# Patient Record
Sex: MALE | Race: WHITE | NOT HISPANIC OR LATINO | Employment: FULL TIME | URBAN - METROPOLITAN AREA
[De-identification: names, ages, dates, MRNs, and addresses within clinical notes are randomized per-mention and may not be internally consistent; named-entity substitution may affect disease eponyms.]

---

## 2021-03-31 ENCOUNTER — HOSPITAL ENCOUNTER (EMERGENCY)
Facility: HOSPITAL | Age: 31
Discharge: HOME | End: 2021-03-31
Attending: EMERGENCY MEDICINE
Payer: COMMERCIAL

## 2021-03-31 VITALS
OXYGEN SATURATION: 100 % | RESPIRATION RATE: 16 BRPM | HEART RATE: 58 BPM | DIASTOLIC BLOOD PRESSURE: 84 MMHG | TEMPERATURE: 98 F | SYSTOLIC BLOOD PRESSURE: 151 MMHG

## 2021-03-31 DIAGNOSIS — R00.2 PALPITATIONS: Primary | ICD-10-CM

## 2021-03-31 LAB
ALBUMIN SERPL BCP-MCNC: 4.5 G/DL (ref 3.5–5)
ALP SERPL-CCNC: 71 U/L (ref 46–116)
ALT SERPL W P-5'-P-CCNC: 21 U/L (ref 12–78)
ANION GAP SERPL CALCULATED.3IONS-SCNC: 8 MMOL/L (ref 4–13)
AST SERPL W P-5'-P-CCNC: 15 U/L (ref 5–45)
BACTERIA UR QL AUTO: ABNORMAL /HPF
BASOPHILS # BLD AUTO: 0.06 THOUSANDS/ΜL (ref 0–0.1)
BASOPHILS NFR BLD AUTO: 1 % (ref 0–1)
BILIRUB SERPL-MCNC: 0.91 MG/DL (ref 0.2–1)
BILIRUB UR QL STRIP: NEGATIVE
BUN SERPL-MCNC: 16 MG/DL (ref 5–25)
CALCIUM SERPL-MCNC: 9.3 MG/DL (ref 8.3–10.1)
CHLORIDE SERPL-SCNC: 105 MMOL/L (ref 100–108)
CLARITY UR: CLEAR
CO2 SERPL-SCNC: 25 MMOL/L (ref 21–32)
COLOR UR: YELLOW
CREAT SERPL-MCNC: 0.91 MG/DL (ref 0.6–1.3)
EOSINOPHIL # BLD AUTO: 0.13 THOUSAND/ΜL (ref 0–0.61)
EOSINOPHIL NFR BLD AUTO: 2 % (ref 0–6)
ERYTHROCYTE [DISTWIDTH] IN BLOOD BY AUTOMATED COUNT: 13 % (ref 11.6–15.1)
FLUAV RNA RESP QL NAA+PROBE: NEGATIVE
FLUBV RNA RESP QL NAA+PROBE: NEGATIVE
GFR SERPL CREATININE-BSD FRML MDRD: 111 ML/MIN/1.73SQ M
GLUCOSE SERPL-MCNC: 92 MG/DL (ref 65–140)
GLUCOSE UR STRIP-MCNC: NEGATIVE MG/DL
HCT VFR BLD AUTO: 49 % (ref 36.5–49.3)
HGB BLD-MCNC: 16.3 G/DL (ref 12–17)
HGB UR QL STRIP.AUTO: NEGATIVE
IMM GRANULOCYTES # BLD AUTO: 0.06 THOUSAND/UL (ref 0–0.2)
IMM GRANULOCYTES NFR BLD AUTO: 1 % (ref 0–2)
KETONES UR STRIP-MCNC: NEGATIVE MG/DL
LEUKOCYTE ESTERASE UR QL STRIP: ABNORMAL
LYMPHOCYTES # BLD AUTO: 2.26 THOUSANDS/ΜL (ref 0.6–4.47)
LYMPHOCYTES NFR BLD AUTO: 35 % (ref 14–44)
MCH RBC QN AUTO: 29.9 PG (ref 26.8–34.3)
MCHC RBC AUTO-ENTMCNC: 33.3 G/DL (ref 31.4–37.4)
MCV RBC AUTO: 90 FL (ref 82–98)
MONOCYTES # BLD AUTO: 0.69 THOUSAND/ΜL (ref 0.17–1.22)
MONOCYTES NFR BLD AUTO: 11 % (ref 4–12)
NEUTROPHILS # BLD AUTO: 3.23 THOUSANDS/ΜL (ref 1.85–7.62)
NEUTS SEG NFR BLD AUTO: 50 % (ref 43–75)
NITRITE UR QL STRIP: NEGATIVE
NON-SQ EPI CELLS URNS QL MICRO: ABNORMAL /HPF
NRBC BLD AUTO-RTO: 0 /100 WBCS
PH UR STRIP.AUTO: 7.5 [PH]
PLATELET # BLD AUTO: 204 THOUSANDS/UL (ref 149–390)
PMV BLD AUTO: 9.7 FL (ref 8.9–12.7)
POTASSIUM SERPL-SCNC: 3.9 MMOL/L (ref 3.5–5.3)
PROT SERPL-MCNC: 7.6 G/DL (ref 6.4–8.2)
PROT UR STRIP-MCNC: NEGATIVE MG/DL
RBC # BLD AUTO: 5.46 MILLION/UL (ref 3.88–5.62)
RBC #/AREA URNS AUTO: ABNORMAL /HPF
RSV RNA RESP QL NAA+PROBE: NEGATIVE
SARS-COV-2 RNA RESP QL NAA+PROBE: NEGATIVE
SODIUM SERPL-SCNC: 138 MMOL/L (ref 136–145)
SP GR UR STRIP.AUTO: 1.02 (ref 1–1.03)
UROBILINOGEN UR QL STRIP.AUTO: 0.2 E.U./DL
WBC # BLD AUTO: 6.43 THOUSAND/UL (ref 4.31–10.16)
WBC #/AREA URNS AUTO: ABNORMAL /HPF

## 2021-03-31 PROCEDURE — 80053 COMPREHEN METABOLIC PANEL: CPT | Performed by: PSYCHIATRY & NEUROLOGY

## 2021-03-31 PROCEDURE — 99284 EMERGENCY DEPT VISIT MOD MDM: CPT

## 2021-03-31 PROCEDURE — 36415 COLL VENOUS BLD VENIPUNCTURE: CPT | Performed by: PSYCHIATRY & NEUROLOGY

## 2021-03-31 PROCEDURE — 93005 ELECTROCARDIOGRAM TRACING: CPT

## 2021-03-31 PROCEDURE — 85025 COMPLETE CBC W/AUTO DIFF WBC: CPT | Performed by: PSYCHIATRY & NEUROLOGY

## 2021-03-31 PROCEDURE — 99284 EMERGENCY DEPT VISIT MOD MDM: CPT | Performed by: EMERGENCY MEDICINE

## 2021-03-31 PROCEDURE — 81001 URINALYSIS AUTO W/SCOPE: CPT | Performed by: PSYCHIATRY & NEUROLOGY

## 2021-03-31 PROCEDURE — 0241U HB NFCT DS VIR RESP RNA 4 TRGT: CPT | Performed by: PSYCHIATRY & NEUROLOGY

## 2021-03-31 NOTE — ED PROVIDER NOTES
History  Chief Complaint   Patient presents with    Medical Problem     States "i was sitting on my couch today and my body forgot how to breath and I got really dizzy " C/o R sided neck pain at this time  75-year-old male with no pertinent medical history presents to ED after experiencing a few seconds where he "forgot how to breath" and developed numbness and tingling in both hands  Pt reports this happened 1 hour ago while he was sitting on the couch, he said out of where he started to panic because he could not breath  He immediately got up to run due to panic and felt dizzy, developed vision changes and felt lightheaded  The symptoms lasted about 5 seconds and pt became worried  Pt reports he does not have any medical issues, was being treated for anxiety but he had side effects to the medications and has stopped them  Pt reports he takes no medications daily, pt admits to having daily nighttime chest pain with palpitations  In the past pt has gone to his family doctor to evaluate chest is and the cardiac workup was negative each time  Pt denies headache and states all of his symptoms have passed, but he still feels bit short breath  Pt also has concern of having COVID due to his work at [employer] and being around many people daily  Pt reports that work has been stressful for the last week because his supervisor and many other employees have been calling out sick  Pt reports only having 1 cup coffee today, he does not eat during the day; reports having 1 meal at night and drinks multiple cups of coffee a day  He also complains of dark urine and reports not hydrating enough with water throughout the day  Family Hx is positive for mother with HTN, ovarian Ca and DM  Pt denies alcohol use and illicit drug use, he does smoke 1 pack of cigarettes weekly  None       History reviewed  No pertinent past medical history      Past Surgical History:   Procedure Laterality Date    NASAL SEPTUM SURGERY History reviewed  No pertinent family history  I have reviewed and agree with the history as documented  E-Cigarette/Vaping     E-Cigarette/Vaping Substances     Social History     Tobacco Use    Smoking status: Current Every Day Smoker     Packs/day: 0 25     Types: Cigarettes    Smokeless tobacco: Never Used   Substance Use Topics    Alcohol use: Never     Frequency: Never    Drug use: Not on file        Review of Systems   Constitutional: Negative for activity change and fever  HENT: Positive for trouble swallowing  Negative for rhinorrhea and sore throat  Respiratory: Positive for shortness of breath  Negative for chest tightness  Cardiovascular: Positive for chest pain (daily every night) and palpitations (daily every night)  Gastrointestinal: Negative for abdominal pain, constipation, diarrhea and vomiting  Genitourinary:        Dark urine   Musculoskeletal: Negative for back pain  Neurological: Positive for dizziness, light-headedness and numbness  Negative for weakness  Psychiatric/Behavioral: The patient is nervous/anxious  All other systems reviewed and are negative  Physical Exam  ED Triage Vitals   Temperature Pulse Respirations Blood Pressure SpO2   03/31/21 1312 03/31/21 1310 03/31/21 1312 03/31/21 1312 03/31/21 1310   98 °F (36 7 °C) 64 16 151/84 100 %      Temp Source Heart Rate Source Patient Position - Orthostatic VS BP Location FiO2 (%)   03/31/21 1312 03/31/21 1310 03/31/21 1312 03/31/21 1312 --   Oral Monitor Sitting Left arm       Pain Score       --                    Orthostatic Vital Signs  Vitals:    03/31/21 1310 03/31/21 1312   BP:  151/84   Pulse: 64 58   Patient Position - Orthostatic VS:  Sitting       Physical Exam  Vitals signs and nursing note reviewed  Constitutional:       General: He is not in acute distress  Appearance: He is well-developed  He is not toxic-appearing  HENT:      Head: Normocephalic and atraumatic     Eyes: General: No scleral icterus  Right eye: No discharge  Left eye: No discharge  Conjunctiva/sclera: Conjunctivae normal    Neck:      Musculoskeletal: Neck supple  Cardiovascular:      Rate and Rhythm: Normal rate and regular rhythm  Pulses: Normal pulses  Heart sounds: No murmur  Pulmonary:      Effort: Pulmonary effort is normal  No respiratory distress  Breath sounds: Normal breath sounds  Chest:      Chest wall: No tenderness  Abdominal:      General: Abdomen is flat  There is no distension  Palpations: Abdomen is soft  Tenderness: There is no abdominal tenderness  There is no guarding or rebound  Skin:     General: Skin is warm and dry  Neurological:      General: No focal deficit present  Mental Status: He is alert and oriented to person, place, and time  Mental status is at baseline  Cranial Nerves: No cranial nerve deficit  Motor: No weakness  Coordination: Coordination normal    Psychiatric:         Mood and Affect: Mood is anxious  Behavior: Behavior normal  Behavior is cooperative  ED Medications  Medications - No data to display    Diagnostic Studies  Results Reviewed     Procedure Component Value Units Date/Time    COVID19, Influenza A/B, RSV PCR, [facility] [[id number]]  (Normal) Collected: 03/31/21 1407    Lab Status: Final result Specimen: Nares from Nose Updated: 03/31/21 1458     SARS-CoV-2 Negative     INFLUENZA A PCR Negative     INFLUENZA B PCR Negative     RSV PCR Negative    Narrative: This test has been authorized by FDA under an EUA (Emergency Use Assay) for use by authorized laboratories  Clinical caution and judgement should be used with the interpretation of these results with consideration of the clinical impression and other laboratory testing  Testing reported as "Positive" or "Negative" has been proven to be accurate according to standard laboratory validation requirements    All testing is performed with control materials showing appropriate reactivity at standard intervals      Urinalysis with microscopic [[id number]]  (Abnormal) Collected: 03/31/21 1407    Lab Status: Final result Specimen: Urine, Clean Catch Updated: 03/31/21 1433     Clarity, UA Clear     Color, UA Yellow     Specific Gravity, UA 1 020     pH, UA 7 5     Glucose, UA Negative mg/dl      Ketones, UA Negative mg/dl      Blood, UA Negative     Protein, UA Negative mg/dl      Nitrite, UA Negative     Bilirubin, UA Negative     Urobilinogen, UA 0 2 E U /dl      Leukocytes, UA Trace     WBC, UA 0-1 /hpf      RBC, UA 0-1 /hpf      Bacteria, UA Occasional /hpf      Epithelial Cells Occasional /hpf     Comprehensive metabolic panel [[id number]] Collected: 03/31/21 1407    Lab Status: Final result Specimen: Blood from Arm, Left Updated: 03/31/21 1431     Sodium 138 mmol/L      Potassium 3 9 mmol/L      Chloride 105 mmol/L      CO2 25 mmol/L      ANION GAP 8 mmol/L      BUN 16 mg/dL      Creatinine 0 91 mg/dL      Glucose 92 mg/dL      Calcium 9 3 mg/dL      AST 15 U/L      ALT 21 U/L      Alkaline Phosphatase 71 U/L      Total Protein 7 6 g/dL      Albumin 4 5 g/dL      Total Bilirubin 0 91 mg/dL      eGFR 111 ml/min/1 73sq m     Narrative:      [place] guidelines for Chronic Kidney Disease (CKD):     Stage 1 with normal or high GFR (GFR > 90 mL/min/1 73 square meters)    Stage 2 Mild CKD (GFR = 60-89 mL/min/1 73 square meters)    Stage 3A Moderate CKD (GFR = 45-59 mL/min/1 73 square meters)    Stage 3B Moderate CKD (GFR = 30-44 mL/min/1 73 square meters)    Stage 4 Severe CKD (GFR = 15-29 mL/min/1 73 square meters)    Stage 5 End Stage CKD (GFR <15 mL/min/1 73 square meters)  Note: GFR calculation is accurate only with a steady state creatinine    CBC and differential [[id number]] Collected: 03/31/21 1407    Lab Status: Final result Specimen: Blood from Arm, Left Updated: 03/31/21 1418     WBC 6 43 Thousand/uL RBC 5 46 Million/uL      Hemoglobin 16 3 g/dL      Hematocrit 49 0 %      MCV 90 fL      MCH 29 9 pg      MCHC 33 3 g/dL      RDW 13 0 %      MPV 9 7 fL      Platelets 667 Thousands/uL      nRBC 0 /100 WBCs      Neutrophils Relative 50 %      Immat GRANS % 1 %      Lymphocytes Relative 35 %      Monocytes Relative 11 %      Eosinophils Relative 2 %      Basophils Relative 1 %      Neutrophils Absolute 3 23 Thousands/µL      Immature Grans Absolute 0 06 Thousand/uL      Lymphocytes Absolute 2 26 Thousands/µL      Monocytes Absolute 0 69 Thousand/µL      Eosinophils Absolute 0 13 Thousand/µL      Basophils Absolute 0 06 Thousands/µL                  No orders to display         Procedures  Procedures      ED Course  ED Course as of Mar 31 1700   Wed Mar 31, 2021   1355 DDX: Anxiety related somatic symptoms, UTI, anemia, electrolyte abnormality secondary to dehydration & poor po intake, less likely ACS, COVID19 infection      1356 UA, CMP, CBC, COVID19, EKG ordered      1429 Normal   CBC and differential   1430 Resident interpretation- Sinus zara, rate 58bpm, normal MS interval, no evidence of ischemia, change from previous EKG in 2019 [facility] at [facility]   ECG 12 lead   1449 Unremarkable    Comprehensive metabolic panel   3808 Trace leukocytes, no urinary symptoms    Urinalysis with microscopic(!)   1652 negative   COVID19, Influenza A/B, RSV PCR, [facility]   1653 Results were reviewed with pt    Pt was given a referral to Cardiology, due to his borderline EKG showing sinus bradycardia with sinus arrhythmia and incomplete RBBB                                  SBIRT 20yo+      Most Recent Value   SBIRT (25 yo +)   In order to provide better care to our patients, we are screening all of our patients for alcohol and drug use  Would it be okay to ask you these screening questions?   No Filed at: 03/31/2021 1316                [facility]    Disposition  Final diagnoses:   Palpitations     Time reflects when diagnosis was documented in both MDM as applicable and the Disposition within this note     Time User Action Codes Description Comment    3/31/2021  3:23 PM Theodora Pierre Add [R00 2] Palpitations       ED Disposition     ED Disposition Condition Date/Time Comment    Discharge Stable Wed Mar 31, 2021  3:25 PM Reynaldo Diaz  [address] discharge to home/self care  Follow-up Information     Follow up With Specialties Details Why Contact Info Additional Information    [facility] Schedule an appointment as soon as possible for a visit  To follow up with chest pain and palpitations [phone]  [address] Cardiology [address] [facility], [address] [place] [address], [address] [place] [address]          There are no discharge medications for this patient  PDMP Review     None           ED Provider  Attending physically available and evaluated Reynaldo Joe  [address]  I managed the patient along with the ED Attending      Electronically Signed by         Kristie Pichardo DO  03/31/21 9546

## 2021-04-01 LAB
ATRIAL RATE: 58 BPM
P AXIS: 52 DEGREES
PR INTERVAL: 122 MS
QRS AXIS: 67 DEGREES
QRSD INTERVAL: 92 MS
QT INTERVAL: 448 MS
QTC INTERVAL: 439 MS
T WAVE AXIS: 56 DEGREES
VENTRICULAR RATE: 58 BPM

## 2021-04-01 PROCEDURE — 93010 ELECTROCARDIOGRAM REPORT: CPT | Performed by: INTERNAL MEDICINE

## 2024-10-07 ENCOUNTER — TELEPHONE (OUTPATIENT)
Dept: GASTROENTEROLOGY | Facility: CLINIC | Age: 34
End: 2024-10-07

## 2024-10-07 ENCOUNTER — APPOINTMENT (OUTPATIENT)
Dept: LAB | Facility: HOSPITAL | Age: 34
End: 2024-10-07
Payer: COMMERCIAL

## 2024-10-07 ENCOUNTER — OFFICE VISIT (OUTPATIENT)
Dept: GASTROENTEROLOGY | Facility: CLINIC | Age: 34
End: 2024-10-07
Payer: COMMERCIAL

## 2024-10-07 VITALS
DIASTOLIC BLOOD PRESSURE: 81 MMHG | SYSTOLIC BLOOD PRESSURE: 123 MMHG | HEIGHT: 70 IN | WEIGHT: 200 LBS | HEART RATE: 60 BPM | BODY MASS INDEX: 28.63 KG/M2

## 2024-10-07 DIAGNOSIS — K92.1 BLOOD IN STOOL: ICD-10-CM

## 2024-10-07 DIAGNOSIS — K92.1 BLOOD IN STOOL: Primary | ICD-10-CM

## 2024-10-07 LAB
ALBUMIN SERPL BCG-MCNC: 4.5 G/DL (ref 3.5–5)
ALP SERPL-CCNC: 68 U/L (ref 34–104)
ALT SERPL W P-5'-P-CCNC: 22 U/L (ref 7–52)
ANION GAP SERPL CALCULATED.3IONS-SCNC: 7 MMOL/L (ref 4–13)
AST SERPL W P-5'-P-CCNC: 20 U/L (ref 13–39)
BASOPHILS # BLD AUTO: 0.07 THOUSANDS/ΜL (ref 0–0.1)
BASOPHILS NFR BLD AUTO: 1 % (ref 0–1)
BILIRUB SERPL-MCNC: 0.8 MG/DL (ref 0.2–1)
BUN SERPL-MCNC: 18 MG/DL (ref 5–25)
CALCIUM SERPL-MCNC: 9.5 MG/DL (ref 8.4–10.2)
CHLORIDE SERPL-SCNC: 103 MMOL/L (ref 96–108)
CO2 SERPL-SCNC: 27 MMOL/L (ref 21–32)
CREAT SERPL-MCNC: 0.91 MG/DL (ref 0.6–1.3)
EOSINOPHIL # BLD AUTO: 0.15 THOUSAND/ΜL (ref 0–0.61)
EOSINOPHIL NFR BLD AUTO: 3 % (ref 0–6)
ERYTHROCYTE [DISTWIDTH] IN BLOOD BY AUTOMATED COUNT: 12.7 % (ref 11.6–15.1)
GFR SERPL CREATININE-BSD FRML MDRD: 108 ML/MIN/1.73SQ M
GLUCOSE P FAST SERPL-MCNC: 96 MG/DL (ref 65–99)
HCT VFR BLD AUTO: 45.5 % (ref 36.5–49.3)
HGB BLD-MCNC: 15.2 G/DL (ref 12–17)
IMM GRANULOCYTES # BLD AUTO: 0.08 THOUSAND/UL (ref 0–0.2)
IMM GRANULOCYTES NFR BLD AUTO: 1 % (ref 0–2)
LYMPHOCYTES # BLD AUTO: 1.68 THOUSANDS/ΜL (ref 0.6–4.47)
LYMPHOCYTES NFR BLD AUTO: 28 % (ref 14–44)
MCH RBC QN AUTO: 29.5 PG (ref 26.8–34.3)
MCHC RBC AUTO-ENTMCNC: 33.4 G/DL (ref 31.4–37.4)
MCV RBC AUTO: 88 FL (ref 82–98)
MONOCYTES # BLD AUTO: 0.61 THOUSAND/ΜL (ref 0.17–1.22)
MONOCYTES NFR BLD AUTO: 10 % (ref 4–12)
NEUTROPHILS # BLD AUTO: 3.44 THOUSANDS/ΜL (ref 1.85–7.62)
NEUTS SEG NFR BLD AUTO: 57 % (ref 43–75)
NRBC BLD AUTO-RTO: 0 /100 WBCS
PLATELET # BLD AUTO: 209 THOUSANDS/UL (ref 149–390)
PMV BLD AUTO: 9.8 FL (ref 8.9–12.7)
POTASSIUM SERPL-SCNC: 4.3 MMOL/L (ref 3.5–5.3)
PROT SERPL-MCNC: 6.8 G/DL (ref 6.4–8.4)
RBC # BLD AUTO: 5.15 MILLION/UL (ref 3.88–5.62)
SODIUM SERPL-SCNC: 137 MMOL/L (ref 135–147)
WBC # BLD AUTO: 6.03 THOUSAND/UL (ref 4.31–10.16)

## 2024-10-07 PROCEDURE — 85025 COMPLETE CBC W/AUTO DIFF WBC: CPT

## 2024-10-07 PROCEDURE — 99203 OFFICE O/P NEW LOW 30 MIN: CPT | Performed by: PHYSICIAN ASSISTANT

## 2024-10-07 PROCEDURE — 36415 COLL VENOUS BLD VENIPUNCTURE: CPT

## 2024-10-07 PROCEDURE — 80053 COMPREHEN METABOLIC PANEL: CPT

## 2024-10-07 RX ORDER — MULTIVITAMIN
1 CAPSULE ORAL DAILY
COMMUNITY

## 2024-10-07 NOTE — TELEPHONE ENCOUNTER
Scheduled date of colonoscopy (as of today): 10/23/24  Physician performing colonoscopy: Dr. Baca  Location of colonoscopy:   Bowel prep reviewed with patient: miralax  Instructions reviewed with patient by: tl given at appt  Clearances: n/a

## 2024-10-07 NOTE — H&P (VIEW-ONLY)
[facility] - Outpatient Consultation  Chris Clay 33 y.o. male MRN: [id number]  Encounter: [id number]          ASSESSMENT AND PLAN:      1. Blood in stool  Patient with blood in stool, most likely outlet pathology, will order blood work including CBC and CMP for further evaluation.  Plan is for colonoscopy for further evaluation.  Prep for procedure explained to patient.  MiraLAX prep ordered.  Further recommendations be made pending his course after colonoscopy is performed.    ______________________________________________________________________    HPI:    33-year-old male who presents today with blood in stool.  He reports that he had episodes of rectal bleeding, one was about 6 months ago and 1 was a few weeks ago.  He noticed blood within the toilet and/or upon wiping it was bright red blood.  Does not take any blood thinners or any excessive NSAIDs.  Denies straining to move his bowels.  He states that he will have loose stools when he drinks coffee but otherwise no significant diarrheal symptoms.  No family history of inflammatory bowel disease or colonic malignancy.  He otherwise denies any abnormal weight loss. He  has had no recent blood work.  Denies any upper GI symptoms.  Denies any melena symptoms.            REVIEW OF SYSTEMS:    CONSTITUTIONAL: Denies any fever, chills, rigors, and weight loss.  HEENT: No earache or tinnitus. Denies hearing loss or visual disturbances.  CARDIOVASCULAR: No chest pain or palpitations.   RESPIRATORY: Denies any cough, hemoptysis, shortness of breath or dyspnea on exertion.  GASTROINTESTINAL: As noted in the History of Present Illness.   GENITOURINARY: No problems with urination. Denies any hematuria or dysuria.  NEUROLOGIC: No dizziness or vertigo, denies headaches.   MUSCULOSKELETAL: Denies any muscle or joint pain.   SKIN: Denies skin rashes or itching.   ENDOCRINE: Denies excessive thirst. Denies intolerance to heat or  "cold.  PSYCHOSOCIAL: Denies depression or anxiety. Denies any recent memory loss.       Historical Information   Past Medical History:   Diagnosis Date    Clotting disorder (HCC) 6 months ago     Past Surgical History:   Procedure Laterality Date    NASAL SEPTUM SURGERY       Social History   Social History     Substance and Sexual Activity   Alcohol Use Not Currently     Social History     Substance and Sexual Activity   Drug Use Never     Social History     Tobacco Use   Smoking Status Every Day    Current packs/day: 0.25    Types: Cigarettes   Smokeless Tobacco Never     Family History   Problem Relation Age of Onset    No Known Problems Mother     No Known Problems Father        Meds/Allergies       Current Outpatient Medications:     Multiple Vitamin (multivitamin) capsule    OIL OF OREGANO PO    No Known Allergies        Objective     Blood pressure 123/81, pulse 60, height 5' 10\" (1.778 m), weight 90.7 kg (200 lb). Body mass index is 28.7 kg/m².        PHYSICAL EXAM:      General Appearance:   Alert, cooperative, no distress   HEENT:   Normocephalic, atraumatic, anicteric.     Neck:  Supple, symmetrical, trachea midline   Lungs:   Clear to auscultation bilaterally; no rales, rhonchi or wheezing; respirations unlabored    Heart::   Regular rate and rhythm; no murmur, rub, or gallop.   Abdomen:   Soft, non-tender, non-distended; normal bowel sounds; no masses, no organomegaly    Genitalia:   Deferred    Rectal:   Deferred    Extremities:  No cyanosis, clubbing or edema    Pulses:  2+ and symmetric    Skin:  No jaundice, rashes, or lesions    Lymph nodes:  No palpable cervical lymphadenopathy        Lab Results:   No visits with results within 1 Day(s) from this visit.   Latest known visit with results is:   Admission on 03/31/2021, Discharged on 03/31/2021   Component Date Value    SARS-CoV-2 03/31/2021 Negative     INFLUENZA A PCR 03/31/2021 Negative     INFLUENZA B PCR 03/31/2021 Negative     RSV PCR " 03/31/2021 Negative     WBC 03/31/2021 6.43     RBC 03/31/2021 5.46     Hemoglobin 03/31/2021 16.3     Hematocrit 03/31/2021 49.0     MCV 03/31/2021 90     MCH 03/31/2021 29.9     MCHC 03/31/2021 33.3     RDW 03/31/2021 13.0     MPV 03/31/2021 9.7     Platelets 03/31/2021 204     nRBC 03/31/2021 0     Segmented % 03/31/2021 50     Immature Grans % 03/31/2021 1     Lymphocytes % 03/31/2021 35     Monocytes % 03/31/2021 11     Eosinophils Relative 03/31/2021 2     Basophils Relative 03/31/2021 1     Absolute Neutrophils 03/31/2021 3.23     Absolute Immature Grans 03/31/2021 0.06     Absolute Lymphocytes 03/31/2021 2.26     Absolute Monocytes 03/31/2021 0.69     Eosinophils Absolute 03/31/2021 0.13     Basophils Absolute 03/31/2021 0.06     Sodium 03/31/2021 138     Potassium 03/31/2021 3.9     Chloride 03/31/2021 105     CO2 03/31/2021 25     ANION GAP 03/31/2021 8     BUN 03/31/2021 16     Creatinine 03/31/2021 0.91     Glucose 03/31/2021 92     Calcium 03/31/2021 9.3     AST 03/31/2021 15     ALT 03/31/2021 21     Alkaline Phosphatase 03/31/2021 71     Total Protein 03/31/2021 7.6     Albumin 03/31/2021 4.5     Total Bilirubin 03/31/2021 0.91     eGFR 03/31/2021 111     Clarity, UA 03/31/2021 Clear     Color, UA 03/31/2021 Yellow     Specific Gravity, UA 03/31/2021 1.020     pH, UA 03/31/2021 7.5     Glucose, UA 03/31/2021 Negative     Ketones, UA 03/31/2021 Negative     Occult Blood, UA 03/31/2021 Negative     Protein, UA 03/31/2021 Negative     Nitrite, UA 03/31/2021 Negative     Bilirubin, UA 03/31/2021 Negative     Urobilinogen, UA 03/31/2021 0.2     Leukocytes, UA 03/31/2021 Trace (A)     WBC, UA 03/31/2021 0-1 (A)     RBC, UA 03/31/2021 0-1 (A)     Bacteria, UA 03/31/2021 Occasional     Epithelial Cells 03/31/2021 Occasional     Ventricular Rate 03/31/2021 58     Atrial Rate 03/31/2021 58     SC Interval 03/31/2021 122     QRSD Interval 03/31/2021 92     QT Interval 03/31/2021 448     QTC Interval 03/31/2021  439     P [place] 03/31/2021 52     QRS Axis 03/31/2021 67     T Wave [place] 03/31/2021 56          Radiology Results:   No results found.

## 2024-10-07 NOTE — PROGRESS NOTES
[facility] - Outpatient Consultation  Chris Clay 33 y.o. male MRN: [id number]  Encounter: [id number]          ASSESSMENT AND PLAN:      1. Blood in stool  Patient with blood in stool, most likely outlet pathology, will order blood work including CBC and CMP for further evaluation.  Plan is for colonoscopy for further evaluation.  Prep for procedure explained to patient.  MiraLAX prep ordered.  Further recommendations be made pending his course after colonoscopy is performed.    ______________________________________________________________________    HPI:    33-year-old male who presents today with blood in stool.  He reports that he had episodes of rectal bleeding, one was about 6 months ago and 1 was a few weeks ago.  He noticed blood within the toilet and/or upon wiping it was bright red blood.  Does not take any blood thinners or any excessive NSAIDs.  Denies straining to move his bowels.  He states that he will have loose stools when he drinks coffee but otherwise no significant diarrheal symptoms.  No family history of inflammatory bowel disease or colonic malignancy.  He otherwise denies any abnormal weight loss. He  has had no recent blood work.  Denies any upper GI symptoms.  Denies any melena symptoms.            REVIEW OF SYSTEMS:    CONSTITUTIONAL: Denies any fever, chills, rigors, and weight loss.  HEENT: No earache or tinnitus. Denies hearing loss or visual disturbances.  CARDIOVASCULAR: No chest pain or palpitations.   RESPIRATORY: Denies any cough, hemoptysis, shortness of breath or dyspnea on exertion.  GASTROINTESTINAL: As noted in the History of Present Illness.   GENITOURINARY: No problems with urination. Denies any hematuria or dysuria.  NEUROLOGIC: No dizziness or vertigo, denies headaches.   MUSCULOSKELETAL: Denies any muscle or joint pain.   SKIN: Denies skin rashes or itching.   ENDOCRINE: Denies excessive thirst. Denies intolerance to heat or  "cold.  PSYCHOSOCIAL: Denies depression or anxiety. Denies any recent memory loss.       Historical Information   Past Medical History:   Diagnosis Date    Clotting disorder (HCC) 6 months ago     Past Surgical History:   Procedure Laterality Date    NASAL SEPTUM SURGERY       Social History   Social History     Substance and Sexual Activity   Alcohol Use Not Currently     Social History     Substance and Sexual Activity   Drug Use Never     Social History     Tobacco Use   Smoking Status Every Day    Current packs/day: 0.25    Types: Cigarettes   Smokeless Tobacco Never     Family History   Problem Relation Age of Onset    No Known Problems Mother     No Known Problems Father        Meds/Allergies       Current Outpatient Medications:     Multiple Vitamin (multivitamin) capsule    OIL OF OREGANO PO    No Known Allergies        Objective     Blood pressure 123/81, pulse 60, height 5' 10\" (1.778 m), weight 90.7 kg (200 lb). Body mass index is 28.7 kg/m².        PHYSICAL EXAM:      General Appearance:   Alert, cooperative, no distress   HEENT:   Normocephalic, atraumatic, anicteric.     Neck:  Supple, symmetrical, trachea midline   Lungs:   Clear to auscultation bilaterally; no rales, rhonchi or wheezing; respirations unlabored    Heart::   Regular rate and rhythm; no murmur, rub, or gallop.   Abdomen:   Soft, non-tender, non-distended; normal bowel sounds; no masses, no organomegaly    Genitalia:   Deferred    Rectal:   Deferred    Extremities:  No cyanosis, clubbing or edema    Pulses:  2+ and symmetric    Skin:  No jaundice, rashes, or lesions    Lymph nodes:  No palpable cervical lymphadenopathy        Lab Results:   No visits with results within 1 Day(s) from this visit.   Latest known visit with results is:   Admission on 03/31/2021, Discharged on 03/31/2021   Component Date Value    SARS-CoV-2 03/31/2021 Negative     INFLUENZA A PCR 03/31/2021 Negative     INFLUENZA B PCR 03/31/2021 Negative     RSV PCR " 03/31/2021 Negative     WBC 03/31/2021 6.43     RBC 03/31/2021 5.46     Hemoglobin 03/31/2021 16.3     Hematocrit 03/31/2021 49.0     MCV 03/31/2021 90     MCH 03/31/2021 29.9     MCHC 03/31/2021 33.3     RDW 03/31/2021 13.0     MPV 03/31/2021 9.7     Platelets 03/31/2021 204     nRBC 03/31/2021 0     Segmented % 03/31/2021 50     Immature Grans % 03/31/2021 1     Lymphocytes % 03/31/2021 35     Monocytes % 03/31/2021 11     Eosinophils Relative 03/31/2021 2     Basophils Relative 03/31/2021 1     Absolute Neutrophils 03/31/2021 3.23     Absolute Immature Grans 03/31/2021 0.06     Absolute Lymphocytes 03/31/2021 2.26     Absolute Monocytes 03/31/2021 0.69     Eosinophils Absolute 03/31/2021 0.13     Basophils Absolute 03/31/2021 0.06     Sodium 03/31/2021 138     Potassium 03/31/2021 3.9     Chloride 03/31/2021 105     CO2 03/31/2021 25     ANION GAP 03/31/2021 8     BUN 03/31/2021 16     Creatinine 03/31/2021 0.91     Glucose 03/31/2021 92     Calcium 03/31/2021 9.3     AST 03/31/2021 15     ALT 03/31/2021 21     Alkaline Phosphatase 03/31/2021 71     Total Protein 03/31/2021 7.6     Albumin 03/31/2021 4.5     Total Bilirubin 03/31/2021 0.91     eGFR 03/31/2021 111     Clarity, UA 03/31/2021 Clear     Color, UA 03/31/2021 Yellow     Specific Gravity, UA 03/31/2021 1.020     pH, UA 03/31/2021 7.5     Glucose, UA 03/31/2021 Negative     Ketones, UA 03/31/2021 Negative     Occult Blood, UA 03/31/2021 Negative     Protein, UA 03/31/2021 Negative     Nitrite, UA 03/31/2021 Negative     Bilirubin, UA 03/31/2021 Negative     Urobilinogen, UA 03/31/2021 0.2     Leukocytes, UA 03/31/2021 Trace (A)     WBC, UA 03/31/2021 0-1 (A)     RBC, UA 03/31/2021 0-1 (A)     Bacteria, UA 03/31/2021 Occasional     Epithelial Cells 03/31/2021 Occasional     Ventricular Rate 03/31/2021 58     Atrial Rate 03/31/2021 58     AL Interval 03/31/2021 122     QRSD Interval 03/31/2021 92     QT Interval 03/31/2021 448     QTC Interval 03/31/2021  439     P [place] 03/31/2021 52     QRS Axis 03/31/2021 67     T Wave [place] 03/31/2021 56          Radiology Results:   No results found.

## 2024-10-22 ENCOUNTER — ANESTHESIA (OUTPATIENT)
Dept: ANESTHESIOLOGY | Facility: HOSPITAL | Age: 34
End: 2024-10-22

## 2024-10-22 ENCOUNTER — ANESTHESIA EVENT (OUTPATIENT)
Dept: ANESTHESIOLOGY | Facility: HOSPITAL | Age: 34
End: 2024-10-22

## 2024-10-23 ENCOUNTER — ANESTHESIA EVENT (OUTPATIENT)
Dept: GASTROENTEROLOGY | Facility: HOSPITAL | Age: 34
End: 2024-10-23
Payer: COMMERCIAL

## 2024-10-23 ENCOUNTER — ANESTHESIA (OUTPATIENT)
Dept: GASTROENTEROLOGY | Facility: HOSPITAL | Age: 34
End: 2024-10-23
Payer: COMMERCIAL

## 2024-10-23 ENCOUNTER — HOSPITAL ENCOUNTER (OUTPATIENT)
Dept: GASTROENTEROLOGY | Facility: HOSPITAL | Age: 34
Setting detail: OUTPATIENT SURGERY
Discharge: HOME | End: 2024-10-23
Payer: COMMERCIAL

## 2024-10-23 VITALS
DIASTOLIC BLOOD PRESSURE: 66 MMHG | RESPIRATION RATE: 18 BRPM | BODY MASS INDEX: 27.43 KG/M2 | SYSTOLIC BLOOD PRESSURE: 113 MMHG | HEART RATE: 63 BPM | WEIGHT: 191.6 LBS | HEIGHT: 70 IN | TEMPERATURE: 97.9 F | OXYGEN SATURATION: 100 %

## 2024-10-23 DIAGNOSIS — K92.1 BLOOD IN STOOL: ICD-10-CM

## 2024-10-23 PROCEDURE — 45385 COLONOSCOPY W/LESION REMOVAL: CPT | Performed by: INTERNAL MEDICINE

## 2024-10-23 PROCEDURE — 88305 TISSUE EXAM BY PATHOLOGIST: CPT | Performed by: STUDENT IN AN ORGANIZED HEALTH CARE EDUCATION/TRAINING PROGRAM

## 2024-10-23 PROCEDURE — 45380 COLONOSCOPY AND BIOPSY: CPT | Performed by: INTERNAL MEDICINE

## 2024-10-23 RX ORDER — LIDOCAINE HYDROCHLORIDE 20 MG/ML
INJECTION, SOLUTION EPIDURAL; INFILTRATION; INTRACAUDAL; PERINEURAL AS NEEDED
Status: DISCONTINUED | OUTPATIENT
Start: 2024-10-23 | End: 2024-10-23

## 2024-10-23 RX ORDER — SODIUM CHLORIDE, SODIUM LACTATE, POTASSIUM CHLORIDE, CALCIUM CHLORIDE 600; 310; 30; 20 MG/100ML; MG/100ML; MG/100ML; MG/100ML
INJECTION, SOLUTION INTRAVENOUS CONTINUOUS PRN
Status: DISCONTINUED | OUTPATIENT
Start: 2024-10-23 | End: 2024-10-23

## 2024-10-23 RX ORDER — PROPOFOL 10 MG/ML
INJECTION, EMULSION INTRAVENOUS AS NEEDED
Status: DISCONTINUED | OUTPATIENT
Start: 2024-10-23 | End: 2024-10-23

## 2024-10-23 RX ORDER — SIMETHICONE 40MG/0.6ML
SUSPENSION, DROPS(FINAL DOSAGE FORM)(ML) ORAL AS NEEDED
Status: COMPLETED | OUTPATIENT
Start: 2024-10-23 | End: 2024-10-23

## 2024-10-23 RX ADMIN — Medication 40 MG: at 07:41

## 2024-10-23 RX ADMIN — LIDOCAINE HYDROCHLORIDE 100 MG: 20 INJECTION, SOLUTION EPIDURAL; INFILTRATION; INTRACAUDAL; PERINEURAL at 07:33

## 2024-10-23 RX ADMIN — PROPOFOL 50 MG: 10 INJECTION, EMULSION INTRAVENOUS at 07:39

## 2024-10-23 RX ADMIN — PROPOFOL 50 MG: 10 INJECTION, EMULSION INTRAVENOUS at 07:51

## 2024-10-23 RX ADMIN — PROPOFOL 100 MG: 10 INJECTION, EMULSION INTRAVENOUS at 07:33

## 2024-10-23 RX ADMIN — PROPOFOL 50 MG: 10 INJECTION, EMULSION INTRAVENOUS at 07:48

## 2024-10-23 RX ADMIN — SODIUM CHLORIDE, SODIUM LACTATE, POTASSIUM CHLORIDE, AND CALCIUM CHLORIDE: .6; .31; .03; .02 INJECTION, SOLUTION INTRAVENOUS at 07:33

## 2024-10-23 RX ADMIN — PROPOFOL 50 MG: 10 INJECTION, EMULSION INTRAVENOUS at 07:42

## 2024-10-23 RX ADMIN — PROPOFOL 50 MG: 10 INJECTION, EMULSION INTRAVENOUS at 07:45

## 2024-10-23 RX ADMIN — PROPOFOL 50 MG: 10 INJECTION, EMULSION INTRAVENOUS at 07:36

## 2024-10-23 RX ADMIN — PROPOFOL 50 MG: 10 INJECTION, EMULSION INTRAVENOUS at 07:34

## 2024-10-23 NOTE — ANESTHESIA PREPROCEDURE EVALUATION
Procedure:  COLONOSCOPY    Relevant Problems   No relevant active problems        Physical Exam    Airway    Mallampati score: II  TM Distance: >3 FB  Neck ROM: full     Dental   No notable dental hx     Cardiovascular      Pulmonary      Other Findings        Anesthesia Plan  ASA Score- 2     Anesthesia Type- IV sedation with anesthesia with ASA Monitors.         Additional Monitors:     Airway Plan:            Plan Factors-Exercise tolerance (METS): >4 METS.    Chart reviewed.    Patient summary reviewed.    Patient is not a current smoker.      There is medical exclusion for perioperative obstructive sleep apnea risk education.        Induction- intravenous.    Postoperative Plan-         Informed Consent- Anesthetic plan and risks discussed with patient.  I personally reviewed this patient with the CRNA. Discussed and agreed on the Anesthesia Plan with the CRNA..

## 2024-10-23 NOTE — INTERVAL H&P NOTE
H&P reviewed. After examining the patient I find no changes in the patients condition since the H&P had been written.    Vitals:    10/23/24 0657   BP: 127/78   Pulse: 77   Resp: 16   Temp: (!) 97.1 °F (36.2 °C)   SpO2: 100%

## 2024-10-23 NOTE — ANESTHESIA POSTPROCEDURE EVALUATION
Post-Op Assessment Note    CV Status:  Stable  Pain Score: 0    Pain management: adequate       Mental Status:  Alert and awake   Hydration Status:  Euvolemic   PONV Controlled:  Controlled   Airway Patency:  Patent     Post Op Vitals Reviewed: Yes    No anethesia notable event occurred.    Staff: Anesthesiologist, CRNA           Last Filed PACU Vitals:  Vitals Value Taken Time   Temp     Pulse     BP     Resp     SpO2         Modified Delonte:  Activity: 2 (10/23/2024  6:50 AM)  Respiration: 2 (10/23/2024  6:50 AM)  Circulation: 2 (10/23/2024  6:50 AM)  Consciousness: 2 (10/23/2024  6:50 AM)  Oxygen Saturation: 2 (10/23/2024  6:50 AM)  Modified Delonte Score: 10 (10/23/2024  6:50 AM)

## 2024-10-25 PROCEDURE — 88305 TISSUE EXAM BY PATHOLOGIST: CPT | Performed by: STUDENT IN AN ORGANIZED HEALTH CARE EDUCATION/TRAINING PROGRAM

## 2024-10-28 NOTE — RESULT ENCOUNTER NOTE
Patient had adenoma polyp removed during colonoscopy.  This is considered precancerous polyp.  Patient should have a follow-up colonoscopy in 3 years.  If any GI symptoms call our office for evaluation accordingly.

## 2025-02-20 ENCOUNTER — APPOINTMENT (EMERGENCY)
Dept: RADIOLOGY | Facility: HOSPITAL | Age: 35
End: 2025-02-20
Payer: COMMERCIAL

## 2025-02-20 ENCOUNTER — HOSPITAL ENCOUNTER (EMERGENCY)
Facility: HOSPITAL | Age: 35
Discharge: HOME | End: 2025-02-20
Attending: EMERGENCY MEDICINE
Payer: COMMERCIAL

## 2025-02-20 VITALS
HEART RATE: 62 BPM | RESPIRATION RATE: 16 BRPM | DIASTOLIC BLOOD PRESSURE: 73 MMHG | OXYGEN SATURATION: 99 % | TEMPERATURE: 97.7 F | SYSTOLIC BLOOD PRESSURE: 176 MMHG

## 2025-02-20 DIAGNOSIS — R06.02 SOB (SHORTNESS OF BREATH): ICD-10-CM

## 2025-02-20 DIAGNOSIS — R51.9 HEADACHE: Primary | ICD-10-CM

## 2025-02-20 LAB
ALBUMIN SERPL BCG-MCNC: 4.8 G/DL (ref 3.5–5)
ALP SERPL-CCNC: 72 U/L (ref 34–104)
ALT SERPL W P-5'-P-CCNC: 20 U/L (ref 7–52)
ANION GAP SERPL CALCULATED.3IONS-SCNC: 11 MMOL/L (ref 4–13)
AST SERPL W P-5'-P-CCNC: 22 U/L (ref 13–39)
BASOPHILS # BLD AUTO: 0.06 THOUSANDS/ΜL (ref 0–0.1)
BASOPHILS NFR BLD AUTO: 1 % (ref 0–1)
BILIRUB SERPL-MCNC: 1.24 MG/DL (ref 0.2–1)
BUN SERPL-MCNC: 12 MG/DL (ref 5–25)
CALCIUM SERPL-MCNC: 9.3 MG/DL (ref 8.4–10.2)
CARDIAC TROPONIN I PNL SERPL HS: <2 NG/L (ref ?–50)
CHLORIDE SERPL-SCNC: 104 MMOL/L (ref 96–108)
CO2 SERPL-SCNC: 23 MMOL/L (ref 21–32)
CREAT SERPL-MCNC: 0.89 MG/DL (ref 0.6–1.3)
EOSINOPHIL # BLD AUTO: 0.06 THOUSAND/ΜL (ref 0–0.61)
EOSINOPHIL NFR BLD AUTO: 1 % (ref 0–6)
ERYTHROCYTE [DISTWIDTH] IN BLOOD BY AUTOMATED COUNT: 12.8 % (ref 11.6–15.1)
GFR SERPL CREATININE-BSD FRML MDRD: 109 ML/MIN/1.73SQ M
GLUCOSE SERPL-MCNC: 79 MG/DL (ref 65–140)
HCT VFR BLD AUTO: 42.1 % (ref 36.5–49.3)
HGB BLD-MCNC: 14.5 G/DL (ref 12–17)
IMM GRANULOCYTES # BLD AUTO: 0.03 THOUSAND/UL (ref 0–0.2)
IMM GRANULOCYTES NFR BLD AUTO: 0 % (ref 0–2)
LYMPHOCYTES # BLD AUTO: 2.33 THOUSANDS/ΜL (ref 0.6–4.47)
LYMPHOCYTES NFR BLD AUTO: 30 % (ref 14–44)
MCH RBC QN AUTO: 29.8 PG (ref 26.8–34.3)
MCHC RBC AUTO-ENTMCNC: 34.4 G/DL (ref 31.4–37.4)
MCV RBC AUTO: 87 FL (ref 82–98)
MONOCYTES # BLD AUTO: 0.73 THOUSAND/ΜL (ref 0.17–1.22)
MONOCYTES NFR BLD AUTO: 10 % (ref 4–12)
NEUTROPHILS # BLD AUTO: 4.45 THOUSANDS/ΜL (ref 1.85–7.62)
NEUTS SEG NFR BLD AUTO: 58 % (ref 43–75)
NRBC BLD AUTO-RTO: 0 /100 WBCS
PLATELET # BLD AUTO: 211 THOUSANDS/UL (ref 149–390)
PMV BLD AUTO: 9.6 FL (ref 8.9–12.7)
POTASSIUM SERPL-SCNC: 3.5 MMOL/L (ref 3.5–5.3)
PROT SERPL-MCNC: 7 G/DL (ref 6.4–8.4)
RBC # BLD AUTO: 4.86 MILLION/UL (ref 3.88–5.62)
SODIUM SERPL-SCNC: 138 MMOL/L (ref 135–147)
WBC # BLD AUTO: 7.66 THOUSAND/UL (ref 4.31–10.16)

## 2025-02-20 PROCEDURE — 71046 X-RAY EXAM CHEST 2 VIEWS: CPT

## 2025-02-20 PROCEDURE — 93005 ELECTROCARDIOGRAM TRACING: CPT

## 2025-02-20 PROCEDURE — 36415 COLL VENOUS BLD VENIPUNCTURE: CPT

## 2025-02-20 PROCEDURE — 99283 EMERGENCY DEPT VISIT LOW MDM: CPT

## 2025-02-20 PROCEDURE — 80053 COMPREHEN METABOLIC PANEL: CPT

## 2025-02-20 PROCEDURE — 85025 COMPLETE CBC W/AUTO DIFF WBC: CPT

## 2025-02-20 PROCEDURE — 84484 ASSAY OF TROPONIN QUANT: CPT

## 2025-02-20 PROCEDURE — 99284 EMERGENCY DEPT VISIT MOD MDM: CPT | Performed by: EMERGENCY MEDICINE

## 2025-02-20 PROCEDURE — 96365 THER/PROPH/DIAG IV INF INIT: CPT

## 2025-02-20 PROCEDURE — 96375 TX/PRO/DX INJ NEW DRUG ADDON: CPT

## 2025-02-20 RX ORDER — KETOROLAC TROMETHAMINE 30 MG/ML
30 INJECTION, SOLUTION INTRAMUSCULAR; INTRAVENOUS ONCE
Status: COMPLETED | OUTPATIENT
Start: 2025-02-20 | End: 2025-02-20

## 2025-02-20 RX ORDER — MAGNESIUM SULFATE HEPTAHYDRATE 40 MG/ML
2 INJECTION, SOLUTION INTRAVENOUS ONCE
Status: COMPLETED | OUTPATIENT
Start: 2025-02-20 | End: 2025-02-20

## 2025-02-20 RX ORDER — METOCLOPRAMIDE HYDROCHLORIDE 5 MG/ML
10 INJECTION INTRAMUSCULAR; INTRAVENOUS ONCE
Status: COMPLETED | OUTPATIENT
Start: 2025-02-20 | End: 2025-02-20

## 2025-02-20 RX ORDER — DIPHENHYDRAMINE HYDROCHLORIDE 50 MG/ML
25 INJECTION INTRAMUSCULAR; INTRAVENOUS ONCE
Status: COMPLETED | OUTPATIENT
Start: 2025-02-20 | End: 2025-02-20

## 2025-02-20 RX ADMIN — SODIUM CHLORIDE 1000 ML: 0.9 INJECTION, SOLUTION INTRAVENOUS at 22:12

## 2025-02-20 RX ADMIN — KETOROLAC TROMETHAMINE 30 MG: 30 INJECTION, SOLUTION INTRAMUSCULAR; INTRAVENOUS at 22:12

## 2025-02-20 RX ADMIN — DIPHENHYDRAMINE HYDROCHLORIDE 25 MG: 50 INJECTION, SOLUTION INTRAMUSCULAR; INTRAVENOUS at 22:13

## 2025-02-20 RX ADMIN — MAGNESIUM SULFATE IN WATER FOR 2 G: 40 INJECTION INTRAVENOUS at 22:16

## 2025-02-20 RX ADMIN — METOCLOPRAMIDE 10 MG: 5 INJECTION, SOLUTION INTRAMUSCULAR; INTRAVENOUS at 22:14

## 2025-02-21 LAB
ATRIAL RATE: 65 BPM
P AXIS: 61 DEGREES
PR INTERVAL: 120 MS
QRS AXIS: 66 DEGREES
QRSD INTERVAL: 98 MS
QT INTERVAL: 436 MS
QTC INTERVAL: 453 MS
T WAVE AXIS: 67 DEGREES
VENTRICULAR RATE: 65 BPM

## 2025-02-21 PROCEDURE — 93010 ELECTROCARDIOGRAM REPORT: CPT | Performed by: INTERNAL MEDICINE

## 2025-02-21 NOTE — ED PROVIDER NOTES
Time reflects when diagnosis was documented in both MDM as applicable and the Disposition within this note       Time User Action Codes Description Comment    2/20/2025 11:41 PM Lenny Morrison Add [R51.9] Headache     2/20/2025 11:42 PM Lenny Morrison Add [R06.02] SOB (shortness of breath)           ED Disposition       ED Disposition   Discharge    Condition   Stable    Date/Time   Thu Feb 20, 2025 11:41 PM    Comment   Chris Clay discharge to home/self care.                   Assessment & Plan       Medical Decision Making  Patient seen and examined.  Neurologic exam is within normal limits, remainder of exam within normal limits.  Airway is intact and without swelling of the lips tongue or posterior oropharynx.  There were no changes in phonation.  Differential includes but is not limited to migraine, complex migraine, anxiety, postnasal drip.  Appropriate labs and imaging ordered.    Chest x-ray is within normal limits, labs within normal limits.  Patient reports significant improvement with migraine cocktail, he reports complete resolution of headache.  He is not having his swallowing symptoms or shortness of breath at this time.  Patient is agreeable to discharge home with follow-up with primary care, information for ENT provided as well.  All questions answered and return precautions discussed.    Amount and/or Complexity of Data Reviewed  Radiology: ordered and independent interpretation performed.    Risk  Prescription drug management.             Medications   ketorolac (TORADOL) injection 30 mg (30 mg Intravenous Given 2/20/25 2212)   metoclopramide (REGLAN) injection 10 mg (10 mg Intravenous Given 2/20/25 2214)   diphenhydrAMINE (BENADRYL) injection 25 mg (25 mg Intravenous Given 2/20/25 2213)   magnesium sulfate 2 g/50 mL IVPB (premix) 2 g (0 g Intravenous Stopped 2/20/25 2309)   sodium chloride 0.9 % bolus 1,000 mL (0 mL Intravenous Stopped 2/20/25 2309)       ED Risk Strat Scores                             SBIRT 22yo+      Flowsheet Row Most Recent Value   Initial Alcohol Screen: US AUDIT-C     1. How often do you have a drink containing alcohol? 0 Filed at: 02/20/2025 2126   2. How many drinks containing alcohol do you have on a typical day you are drinking?  0 Filed at: 02/20/2025 2126   3a. Male UNDER 65: How often do you have five or more drinks on one occasion? 0 Filed at: 02/20/2025 2126   3b. FEMALE Any Age, or MALE 65+: How often do you have 4 or more drinks on one occassion? 0 Filed at: 02/20/2025 2126   Audit-C Score 0 Filed at: 02/20/2025 2126   MARCY: How many times in the past year have you...    Used an illegal drug or used a prescription medication for non-medical reasons? Never Filed at: 02/20/2025 2126                            History of Present Illness       Chief Complaint   Patient presents with    Headache     Pt is complaining of 6/10 headache for the last 4 days. Pt is also complaining of sob and trouble swallowing. No chest pain N/V or fevers.        Past Medical History:   Diagnosis Date    Clotting disorder (HCC) 6 months ago      Past Surgical History:   Procedure Laterality Date    NASAL SEPTUM SURGERY        Family History   Problem Relation Age of Onset    No Known Problems Mother     No Known Problems Father       Social History     Tobacco Use    Smoking status: Former     Current packs/day: 0.25     Types: Cigarettes    Smokeless tobacco: Never    Tobacco comments:     Quit 1 yr   Vaping Use    Vaping status: Former   Substance Use Topics    Alcohol use: Yes     Comment: rarely    Drug use: Never      E-Cigarette/Vaping    E-Cigarette Use Former User       E-Cigarette/Vaping Substances      I have reviewed and agree with the history as documented.     34-year-old male presents with 4 days of headache and trouble swallowing with intermittent shortness of breath.  He states when he swallows occasionally he feels like he has to manually swallow and think about it  and when he feels this he starts to panic and then feels short of breath.  This does not happen all the time and it does not happen consistently.  He has not noticed any foods that have triggered it.  It happens both with solids and liquids.  He has a history of headaches that are throbbing and aching in nature.  He states this is similar but different because it feels more like a pressure headache.  This headache came on gradually.  He takes Excedrin for headaches however he has not taken any of anything today.  He rates this a 6 out of 10 pain.  He denies weakness or sensory changes.  He denies fever, chills, headache, lightheadedness, dizziness, chest pain, nasal congestion, cough, nausea, vomiting, diarrhea, hematuria, dysuria.      History provided by:  Patient      Review of Systems   Constitutional:  Negative for chills, fatigue and fever.   HENT:  Negative for congestion, ear pain, postnasal drip, rhinorrhea, sinus pain and sore throat.    Eyes:  Negative for pain and visual disturbance.   Respiratory:  Positive for shortness of breath. Negative for cough and chest tightness.    Cardiovascular:  Negative for chest pain and palpitations.   Gastrointestinal:  Negative for abdominal pain, constipation, diarrhea, nausea and vomiting.   Genitourinary:  Negative for difficulty urinating, dysuria and hematuria.   Musculoskeletal:  Negative for back pain.   Skin:  Negative for color change and rash.   Neurological:  Positive for headaches. Negative for dizziness, seizures, syncope, weakness, light-headedness and numbness.   All other systems reviewed and are negative.          Objective       ED Triage Vitals   Temperature Pulse Blood Pressure Respirations SpO2 Patient Position - Orthostatic VS   02/20/25 2118 02/20/25 2118 02/20/25 2118 02/20/25 2118 02/20/25 2118 02/20/25 2118   97.7 °F (36.5 °C) 78 (!) 176/73 20 100 % Lying      Temp Source Heart Rate Source BP Location FiO2 (%) Pain Score    02/20/25 2118  02/20/25 2118 02/20/25 2118 -- 02/20/25 2212    Oral Monitor Right arm  7      Vitals      Date and Time Temp Pulse SpO2 Resp BP Pain Score FACES Pain Rating User   02/20/25 2315 -- 62 99 % 16 -- -- -- CH   02/20/25 2212 -- -- -- -- -- 7 -- LA   02/20/25 2118 97.7 °F (36.5 °C) 78 100 % 20 176/73 -- -- JR            Physical Exam  Constitutional:       General: He is not in acute distress.     Appearance: He is not diaphoretic.   HENT:      Head: Normocephalic and atraumatic.      Nose: No congestion or rhinorrhea.      Mouth/Throat:      Mouth: Mucous membranes are moist.      Pharynx: No oropharyngeal exudate.   Eyes:      General: No visual field deficit or scleral icterus.  Cardiovascular:      Rate and Rhythm: Normal rate and regular rhythm.      Heart sounds: Normal heart sounds. No murmur heard.     No friction rub. No gallop.   Pulmonary:      Effort: No respiratory distress.      Breath sounds: Normal breath sounds. No wheezing, rhonchi or rales.   Abdominal:      General: Abdomen is flat. There is no distension.      Palpations: Abdomen is soft.      Tenderness: There is no abdominal tenderness.   Lymphadenopathy:      Cervical: No cervical adenopathy.   Skin:     General: Skin is warm and dry.      Capillary Refill: Capillary refill takes less than 2 seconds.   Neurological:      General: No focal deficit present.      Mental Status: He is alert and oriented to person, place, and time.      GCS: GCS eye subscore is 4. GCS verbal subscore is 5. GCS motor subscore is 6.      Cranial Nerves: Cranial nerves 2-12 are intact. No dysarthria or facial asymmetry.      Sensory: Sensation is intact.      Motor: Motor function is intact. No tremor, atrophy or abnormal muscle tone.      Coordination: Coordination is intact. Finger-Nose-Finger Test and Heel to Shin Test normal.      Gait: Gait is intact.         Results Reviewed       Procedure Component Value Units Date/Time    HS Troponin 0hr (reflex protocol)  [[id number]]  (Normal) Collected: 02/20/25 2125    Lab Status: Final result Specimen: Blood from Arm, Right Updated: 02/20/25 2155     hs TnI 0hr <2 ng/L     Comprehensive metabolic panel [[id number]]  (Abnormal) Collected: 02/20/25 2125    Lab Status: Final result Specimen: Blood from Arm, Right Updated: 02/20/25 2147     Sodium 138 mmol/L      Potassium 3.5 mmol/L      Chloride 104 mmol/L      CO2 23 mmol/L      ANION GAP 11 mmol/L      BUN 12 mg/dL      Creatinine 0.89 mg/dL      Glucose 79 mg/dL      Calcium 9.3 mg/dL      AST 22 U/L      ALT 20 U/L      Alkaline Phosphatase 72 U/L      Total Protein 7.0 g/dL      Albumin 4.8 g/dL      Total Bilirubin 1.24 mg/dL      eGFR 109 ml/min/1.73sq m     Narrative:      [employer] Kid[employer] guidelines for Chronic Kidney Disease (CKD):     Stage 1 with normal or high GFR (GFR > 90 mL/min/1.73 square meters)    Stage 2 Mild CKD (GFR = 60-89 mL/min/1.73 square meters)    Stage 3A Moderate CKD (GFR = 45-59 mL/min/1.73 square meters)    Stage 3B Moderate CKD (GFR = 30-44 mL/min/1.73 square meters)    Stage 4 Severe CKD (GFR = 15-29 mL/min/1.73 square meters)    Stage 5 End Stage CKD (GFR <15 mL/min/1.73 square meters)  Note: GFR calculation is accurate only with a steady state creatinine    CBC and differential [[id number]] Collected: 02/20/25 2125    Lab Status: Final result Specimen: Blood from Arm, Right Updated: 02/20/25 2137     WBC 7.66 Thousand/uL      RBC 4.86 Million/uL      Hemoglobin 14.5 g/dL      Hematocrit 42.1 %      MCV 87 fL      MCH 29.8 pg      MCHC 34.4 g/dL      RDW 12.8 %      MPV 9.6 fL      Platelets 211 Thousands/uL      nRBC 0 /100 WBCs      Segmented % 58 %      Immature Grans % 0 %      Lymphocytes % 30 %      Monocytes % 10 %      Eosinophils Relative 1 %      Basophils Relative 1 %      Absolute Neutrophils 4.45 Thousands/µL      Absolute Immature Grans 0.03 Thousand/uL      Absolute Lymphocytes 2.33 Thousands/µL      Absolute  Monocytes 0.73 Thousand/µL      Eosinophils Absolute 0.06 Thousand/µL      Basophils Absolute 0.06 Thousands/µL             XR chest 2 views   ED Interpretation by Lenny Morrison MD (02/20 2300)   No acute cardiopulmonary disease. Independently interpreted by myself.          ECG 12 Lead Documentation Only    Date/Time: 2/21/2025 12:21 AM    Performed by: Lenny Morrison MD  Authorized by: Lenny Morrison MD    Indications / Diagnosis:  SOB  ECG reviewed by me, the ED Provider: yes    Patient location:  ED  Previous ECG:     Previous ECG:  Compared to current    Similarity:  No change    Comparison to cardiac monitor: Yes    Interpretation:     Interpretation: normal    Rate:     ECG rate:  65    ECG rate assessment: normal    Rhythm:     Rhythm: sinus rhythm    Ectopy:     Ectopy: none    QRS:     QRS axis:  Normal    QRS intervals:  Normal  Conduction:     Conduction: normal    ST segments:     ST segments:  Normal  T waves:     T waves: normal        ED Medication and Procedure Management   Prior to Admission Medications   Prescriptions Last Dose Informant Patient Reported? Taking?   Multiple Vitamin (multivitamin) capsule  Self Yes No   Sig: Take 1 capsule by mouth daily   OIL OF OREGANO PO  Self Yes No   Sig: Take by mouth      Facility-Administered Medications: None     Discharge Medication List as of 2/20/2025 11:43 PM        CONTINUE these medications which have NOT CHANGED    Details   Multiple Vitamin (multivitamin) capsule Take 1 capsule by mouth daily, Historical Med      OIL OF [place]ANO PO Take by mouth, Historical Med           No discharge procedures on file.  ED SEPSIS DOCUMENTATION   Time reflects when diagnosis was documented in both MDM as applicable and the Disposition within this note       Time User Action Codes Description Comment    2/20/2025 11:41 PM Lenny Morrison Add [R51.9] Headache     2/20/2025 11:42 PM Lenny Morrison Add [R06.02] SOB (shortness of breath)                  Lenny Morrison MD  02/21/25  0022

## 2025-02-21 NOTE — DISCHARGE INSTRUCTIONS
Please follow-up with your primary care doctor to discuss further headache treatment.  Please schedule an appointment with ENT if your symptoms do not resolve.

## 2025-02-26 NOTE — ED ATTENDING ATTESTATION
2/20/2025  I, Gerhard Irby MD, saw and evaluated the patient. I have discussed the patient with the resident/non-physician practitioner and agree with the resident's/non-physician practitioner's findings, Plan of Care, and MDM as documented in the resident's/non-physician practitioner's note, except where noted. All available labs and Radiology studies were reviewed.  I was present for key portions of any procedure(s) performed by the resident/non-physician practitioner and I was immediately available to provide assistance.       At this point I agree with the current assessment done in the Emergency Department.  I have conducted an independent evaluation of this patient a history and physical is as follows:    34-year-old male presented for evaluation of 4 days of a headache.  No significant relief from OTC medication.  Denies any other symptoms associated with headache such as photophobia, phonophobia, neck pain, visual disturbance, fever, chills, nausea, vomiting.    He does note he has felt some abnormality with swallowing.  He describes it as needing to think about swallowing.  No difficulty swallowing liquids or solids and no pain with swallowing.  States he just is more aware of swallowing and needs to think about doing it.      ED Course     He had symptomatic improvement with migraine cocktail here.  Plan discharge home.    Critical Care Time  Procedures